# Patient Record
Sex: FEMALE | Race: WHITE | NOT HISPANIC OR LATINO | Employment: FULL TIME | URBAN - METROPOLITAN AREA
[De-identification: names, ages, dates, MRNs, and addresses within clinical notes are randomized per-mention and may not be internally consistent; named-entity substitution may affect disease eponyms.]

---
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
## 2021-06-01 VITALS — HEIGHT: 62 IN | BODY MASS INDEX: 19.14 KG/M2 | WEIGHT: 104 LBS

## 2021-06-16 NOTE — PROGRESS NOTES
Assessment: /    Plan:    1. Strep pharyngitis  amoxicillin (AMOXIL) 400 mg/5 mL suspension   2. Sorethroat  Rapid Strep A Screen-Throat   3. Fever  Rapid Strep A Screen-Throat       Recheck if any problem.      Subjective:    HPI:  Lea Jiménez is a 17-year-old female presenting with a sore throat.  Symptoms began 3 days ago, and have been worsening.  She also has a fever.  She has a slight cough, which is nonproductive.  She has associated headache.  She has been taking Tylenol.    Social Hx: Non-smoker.    Review of Systems: No rash, vomiting, wheezing.      Current Outpatient Prescriptions   Medication Sig Dispense Refill     amoxicillin (AMOXIL) 400 mg/5 mL suspension 10 ml 2 times daily 200 mL 0     ibuprofen (ADVIL,MOTRIN) 100 MG chewable tablet Chew 100 mg every 8 (eight) hours as needed for fever.       No current facility-administered medications for this visit.          Objective:    Vitals:    03/02/18 1552   BP: (!) 90/66   Pulse: 108   Temp: 98.8  F (37.1  C)   SpO2: 99%       Gen:  NAD, VSS  Ears normal  Throat red  Neck supple without adenopathy  Lungs:  normal  Heart:  normal  Skin without rash    Rapid strep positive      ADDITIONAL HISTORY SUMMARIZED (2): None.  DECISION TO OBTAIN EXTRA INFORMATION (1): None.   RADIOLOGY TESTS (1): None.  LABS (1):  strep.  MEDICINE TESTS (1): None.  INDEPENDENT REVIEW (2 each): None.     Total Data Points: 1